# Patient Record
Sex: MALE | Race: WHITE | NOT HISPANIC OR LATINO | Employment: FULL TIME | URBAN - METROPOLITAN AREA
[De-identification: names, ages, dates, MRNs, and addresses within clinical notes are randomized per-mention and may not be internally consistent; named-entity substitution may affect disease eponyms.]

---

## 2017-01-09 ENCOUNTER — OFFICE VISIT - HEALTHEAST (OUTPATIENT)
Dept: FAMILY MEDICINE | Facility: CLINIC | Age: 32
End: 2017-01-09

## 2017-01-09 DIAGNOSIS — F19.10 POLYSUBSTANCE ABUSE (H): ICD-10-CM

## 2017-01-09 DIAGNOSIS — F31.62 BIPOLAR 1 DISORDER, MIXED, MODERATE (H): ICD-10-CM

## 2017-01-09 ASSESSMENT — MIFFLIN-ST. JEOR: SCORE: 1819.62

## 2017-03-23 ENCOUNTER — COMMUNICATION - HEALTHEAST (OUTPATIENT)
Dept: FAMILY MEDICINE | Facility: CLINIC | Age: 32
End: 2017-03-23

## 2017-03-23 DIAGNOSIS — K60.2 ANAL FISSURE: ICD-10-CM

## 2017-04-23 ENCOUNTER — RECORDS - HEALTHEAST (OUTPATIENT)
Dept: ADMINISTRATIVE | Facility: OTHER | Age: 32
End: 2017-04-23

## 2017-05-30 ENCOUNTER — COMMUNICATION - HEALTHEAST (OUTPATIENT)
Dept: FAMILY MEDICINE | Facility: CLINIC | Age: 32
End: 2017-05-30

## 2017-05-30 DIAGNOSIS — F31.62 BIPOLAR 1 DISORDER, MIXED, MODERATE (H): ICD-10-CM

## 2017-05-30 DIAGNOSIS — K60.2 ANAL FISSURE: ICD-10-CM

## 2017-06-22 ENCOUNTER — COMMUNICATION - HEALTHEAST (OUTPATIENT)
Dept: FAMILY MEDICINE | Facility: CLINIC | Age: 32
End: 2017-06-22

## 2017-06-22 DIAGNOSIS — F31.62 BIPOLAR 1 DISORDER, MIXED, MODERATE (H): ICD-10-CM

## 2017-06-27 ENCOUNTER — OFFICE VISIT - HEALTHEAST (OUTPATIENT)
Dept: FAMILY MEDICINE | Facility: CLINIC | Age: 32
End: 2017-06-27

## 2017-06-27 DIAGNOSIS — F17.200 TOBACCO USE DISORDER: ICD-10-CM

## 2017-06-27 DIAGNOSIS — K04.7 TOOTH INFECTION: ICD-10-CM

## 2017-06-27 DIAGNOSIS — F12.20 CANNABIS DEPENDENCE (H): ICD-10-CM

## 2017-06-27 DIAGNOSIS — F41.1 ANXIETY STATE: ICD-10-CM

## 2017-06-27 DIAGNOSIS — F31.62 BIPOLAR 1 DISORDER, MIXED, MODERATE (H): ICD-10-CM

## 2017-08-17 ENCOUNTER — COMMUNICATION - HEALTHEAST (OUTPATIENT)
Dept: FAMILY MEDICINE | Facility: CLINIC | Age: 32
End: 2017-08-17

## 2017-08-20 ENCOUNTER — AMBULATORY - HEALTHEAST (OUTPATIENT)
Dept: FAMILY MEDICINE | Facility: CLINIC | Age: 32
End: 2017-08-20

## 2017-10-10 ENCOUNTER — COMMUNICATION - HEALTHEAST (OUTPATIENT)
Dept: FAMILY MEDICINE | Facility: CLINIC | Age: 32
End: 2017-10-10

## 2017-12-22 ENCOUNTER — COMMUNICATION - HEALTHEAST (OUTPATIENT)
Dept: FAMILY MEDICINE | Facility: CLINIC | Age: 32
End: 2017-12-22

## 2017-12-22 DIAGNOSIS — F31.62 BIPOLAR 1 DISORDER, MIXED, MODERATE (H): ICD-10-CM

## 2017-12-26 ENCOUNTER — COMMUNICATION - HEALTHEAST (OUTPATIENT)
Dept: FAMILY MEDICINE | Facility: CLINIC | Age: 32
End: 2017-12-26

## 2017-12-26 DIAGNOSIS — K60.2 ANAL FISSURE: ICD-10-CM

## 2018-01-09 ENCOUNTER — COMMUNICATION - HEALTHEAST (OUTPATIENT)
Dept: FAMILY MEDICINE | Facility: CLINIC | Age: 33
End: 2018-01-09

## 2018-01-20 ENCOUNTER — COMMUNICATION - HEALTHEAST (OUTPATIENT)
Dept: FAMILY MEDICINE | Facility: CLINIC | Age: 33
End: 2018-01-20

## 2018-01-20 DIAGNOSIS — F31.62 BIPOLAR 1 DISORDER, MIXED, MODERATE (H): ICD-10-CM

## 2018-01-31 ENCOUNTER — COMMUNICATION - HEALTHEAST (OUTPATIENT)
Dept: FAMILY MEDICINE | Facility: CLINIC | Age: 33
End: 2018-01-31

## 2018-01-31 DIAGNOSIS — F41.1 ANXIETY STATE: ICD-10-CM

## 2018-02-05 ENCOUNTER — COMMUNICATION - HEALTHEAST (OUTPATIENT)
Dept: FAMILY MEDICINE | Facility: CLINIC | Age: 33
End: 2018-02-05

## 2018-04-30 ENCOUNTER — COMMUNICATION - HEALTHEAST (OUTPATIENT)
Dept: FAMILY MEDICINE | Facility: CLINIC | Age: 33
End: 2018-04-30

## 2018-04-30 DIAGNOSIS — F31.62 BIPOLAR 1 DISORDER, MIXED, MODERATE (H): ICD-10-CM

## 2018-06-26 ENCOUNTER — COMMUNICATION - HEALTHEAST (OUTPATIENT)
Dept: FAMILY MEDICINE | Facility: CLINIC | Age: 33
End: 2018-06-26

## 2018-06-26 DIAGNOSIS — F31.62 BIPOLAR 1 DISORDER, MIXED, MODERATE (H): ICD-10-CM

## 2018-12-31 ENCOUNTER — COMMUNICATION - HEALTHEAST (OUTPATIENT)
Dept: FAMILY MEDICINE | Facility: CLINIC | Age: 33
End: 2018-12-31

## 2018-12-31 DIAGNOSIS — F31.62 BIPOLAR 1 DISORDER, MIXED, MODERATE (H): ICD-10-CM

## 2019-05-29 ENCOUNTER — COMMUNICATION - HEALTHEAST (OUTPATIENT)
Dept: FAMILY MEDICINE | Facility: CLINIC | Age: 34
End: 2019-05-29

## 2019-05-29 DIAGNOSIS — K60.2 ANAL FISSURE: ICD-10-CM

## 2019-05-31 ENCOUNTER — COMMUNICATION - HEALTHEAST (OUTPATIENT)
Dept: FAMILY MEDICINE | Facility: CLINIC | Age: 34
End: 2019-05-31

## 2019-10-20 ENCOUNTER — COMMUNICATION - HEALTHEAST (OUTPATIENT)
Dept: FAMILY MEDICINE | Facility: CLINIC | Age: 34
End: 2019-10-20

## 2019-10-20 DIAGNOSIS — F41.1 ANXIETY STATE: ICD-10-CM

## 2019-10-20 DIAGNOSIS — K60.2 ANAL FISSURE: ICD-10-CM

## 2019-10-25 ENCOUNTER — COMMUNICATION - HEALTHEAST (OUTPATIENT)
Dept: FAMILY MEDICINE | Facility: CLINIC | Age: 34
End: 2019-10-25

## 2019-10-25 DIAGNOSIS — F31.62 BIPOLAR 1 DISORDER, MIXED, MODERATE (H): ICD-10-CM

## 2020-02-24 ENCOUNTER — COMMUNICATION - HEALTHEAST (OUTPATIENT)
Dept: FAMILY MEDICINE | Facility: CLINIC | Age: 35
End: 2020-02-24

## 2020-02-24 DIAGNOSIS — K60.2 ANAL FISSURE: ICD-10-CM

## 2020-02-26 ENCOUNTER — COMMUNICATION - HEALTHEAST (OUTPATIENT)
Dept: FAMILY MEDICINE | Facility: CLINIC | Age: 35
End: 2020-02-26

## 2020-04-27 ENCOUNTER — COMMUNICATION - HEALTHEAST (OUTPATIENT)
Dept: FAMILY MEDICINE | Facility: CLINIC | Age: 35
End: 2020-04-27

## 2020-04-27 DIAGNOSIS — K60.2 ANAL FISSURE: ICD-10-CM

## 2020-04-30 ENCOUNTER — COMMUNICATION - HEALTHEAST (OUTPATIENT)
Dept: FAMILY MEDICINE | Facility: CLINIC | Age: 35
End: 2020-04-30

## 2020-04-30 DIAGNOSIS — K60.2 ANAL FISSURE: ICD-10-CM

## 2020-05-18 ENCOUNTER — OFFICE VISIT (OUTPATIENT)
Dept: URGENT CARE | Facility: URGENT CARE | Age: 35
End: 2020-05-18
Payer: COMMERCIAL

## 2020-05-18 VITALS
HEART RATE: 104 BPM | HEIGHT: 72 IN | BODY MASS INDEX: 27.36 KG/M2 | SYSTOLIC BLOOD PRESSURE: 122 MMHG | DIASTOLIC BLOOD PRESSURE: 68 MMHG | TEMPERATURE: 98.3 F | WEIGHT: 202 LBS | OXYGEN SATURATION: 98 %

## 2020-05-18 DIAGNOSIS — K12.1 ORAL ULCERATION DUE TO THERMAL BURN: Primary | ICD-10-CM

## 2020-05-18 DIAGNOSIS — T28.0XXA ORAL ULCERATION DUE TO THERMAL BURN: Primary | ICD-10-CM

## 2020-05-18 PROCEDURE — 99203 OFFICE O/P NEW LOW 30 MIN: CPT | Performed by: PHYSICIAN ASSISTANT

## 2020-05-18 RX ORDER — HYDROCODONE BITARTRATE AND ACETAMINOPHEN 5; 325 MG/1; MG/1
1 TABLET ORAL EVERY 6 HOURS PRN
Qty: 10 TABLET | Refills: 0 | Status: SHIPPED | OUTPATIENT
Start: 2020-05-18 | End: 2020-05-22

## 2020-05-18 RX ORDER — IBUPROFEN 600 MG/1
600 TABLET, FILM COATED ORAL EVERY 6 HOURS PRN
Qty: 40 TABLET | Refills: 0 | Status: SHIPPED | OUTPATIENT
Start: 2020-05-18 | End: 2020-05-28

## 2020-05-18 RX ORDER — LIDOCAINE HYDROCHLORIDE 20 MG/ML
15 SOLUTION OROPHARYNGEAL
Qty: 100 ML | Refills: 0 | Status: SHIPPED | OUTPATIENT
Start: 2020-05-18

## 2020-05-18 RX ORDER — ACETAMINOPHEN 500 MG
500-1000 TABLET ORAL EVERY 6 HOURS PRN
Qty: 40 TABLET | Refills: 0 | Status: SHIPPED | OUTPATIENT
Start: 2020-05-18 | End: 2020-05-28

## 2020-05-18 ASSESSMENT — ENCOUNTER SYMPTOMS
GASTROINTESTINAL NEGATIVE: 1
RESPIRATORY NEGATIVE: 1
MUSCULOSKELETAL NEGATIVE: 1
CONSTITUTIONAL NEGATIVE: 1
PSYCHIATRIC NEGATIVE: 1
CARDIOVASCULAR NEGATIVE: 1

## 2020-05-18 ASSESSMENT — MIFFLIN-ST. JEOR: SCORE: 1884.27

## 2020-05-18 NOTE — LETTER
May 18, 2020      To Whom It May Concern:      Bruce Nolan was seen in our urgent care today, 05/18/20.  I expect his condition to improve over the next 1-3 days. Please excuse his absence today. He may return to work when improved.    Sincerely,        Teja Figueroa PA-C
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
## 2020-05-22 DIAGNOSIS — T28.0XXA ORAL ULCERATION DUE TO THERMAL BURN: ICD-10-CM

## 2020-05-22 DIAGNOSIS — K12.1 ORAL ULCERATION DUE TO THERMAL BURN: ICD-10-CM

## 2020-05-22 RX ORDER — HYDROCODONE BITARTRATE AND ACETAMINOPHEN 5; 325 MG/1; MG/1
1 TABLET ORAL EVERY 6 HOURS PRN
Qty: 5 TABLET | Refills: 0 | Status: SHIPPED | OUTPATIENT
Start: 2020-05-22

## 2020-05-22 NOTE — TELEPHONE ENCOUNTER
PT SAID THAT SAID THAT NORCO DOESN'T HELP AND HE STILL IN ALOT OF PAIN. SOMETHING ABOUT HIS TONGUE BURN. HE WANT COUPLE MORE DAY AND INCREASE STRENTH IF POSSIBLE. HE WILLING TO COME BACK TO SEE MD IF NEEDED. Please reach out to pt if needed        Thank You,  Greg Neal, [facility]  On behalf of [facility]

## 2020-05-28 ENCOUNTER — NURSE TRIAGE (OUTPATIENT)
Dept: NURSING | Facility: CLINIC | Age: 35
End: 2020-05-28

## 2020-05-28 ENCOUNTER — OFFICE VISIT (OUTPATIENT)
Dept: URGENT CARE | Facility: URGENT CARE | Age: 35
End: 2020-05-28
Payer: COMMERCIAL

## 2020-05-28 VITALS
OXYGEN SATURATION: 97 % | DIASTOLIC BLOOD PRESSURE: 62 MMHG | HEIGHT: 72 IN | HEART RATE: 73 BPM | TEMPERATURE: 98 F | SYSTOLIC BLOOD PRESSURE: 117 MMHG | BODY MASS INDEX: 27.36 KG/M2 | WEIGHT: 202 LBS

## 2020-05-28 DIAGNOSIS — T28.0XXD: Primary | ICD-10-CM

## 2020-05-28 PROCEDURE — 99214 OFFICE O/P EST MOD 30 MIN: CPT | Performed by: NURSE PRACTITIONER

## 2020-05-28 RX ORDER — LIDOCAINE 50 MG/G
OINTMENT TOPICAL ONCE
Status: CANCELLED | OUTPATIENT
Start: 2020-05-28 | End: 2020-05-28

## 2020-05-28 ASSESSMENT — MIFFLIN-ST. JEOR: SCORE: 1884.27

## 2020-05-28 NOTE — TELEPHONE ENCOUNTER
Pharmacist Jaxson at [facility] reports they do not have lidocaine 5% cream (See  note from today). Jaxson reports they do have viscous lidocaine 2% or lidocaine/prilocaine 2.65% cream. Needs to be sent asap as pharmacy is closing at 1630 today.     Writer contacted Danita at [facility] who connected Writer to DOMINICK Starks. Ivone requests pharmacist call her at [phone]. Writer spoke with pharmacist Garrett and gave him the information.    Garrett will contact DOMINICK Wiseman.    Reason for Disposition    Pharmacy calling with prescription questions and triager unable to answer question    Protocols used: MEDICATION QUESTION CALL-A-
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
## 2020-05-28 NOTE — PATIENT INSTRUCTIONS
Use just a dab of the Lidocaine cream/ointment on the tongue . Place with a cotton tip applicator. Spit out any excess.    Acetaminophen (Tylenol) may be taken up to 4000mg daily (3000mg if over 65) which would be 2 regular strength tablets (325mg) or two extra strength tablets(500mg) up to 4 times a day (3 times a day if over 65).   Check for acetaminophen in other OTC or prescription medications and be sure you add this in the maximum amount you take every day.    Too much acetaminophen can lead to serious liver damage. DO NOT TAKE if you have a history of liver disease.    Ibuprofen 200mg tablets may be taken up to 4 pills 4 times a day(three times a day if over 65)  to the maximum of 3200mg,  (2400mg if >65)  daily as needed for pain.   Take with food.  Don't take with aspirin, Aleve or other antiinflammatory medication or with warfarin. DO NOT TAKE if you have a history of kidney disease.

## 2020-06-01 ENCOUNTER — NURSE TRIAGE (OUTPATIENT)
Dept: NURSING | Facility: CLINIC | Age: 35
End: 2020-06-01
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
## 2021-05-29 NOTE — TELEPHONE ENCOUNTER
RN cannot approve Refill Request    RN can NOT refill this medication med is not covered by policy/route to provider. Last office visit: 6/27/2017 Jasen Jacques MD Last Physical: Visit date not found Last MTM visit: Visit date not found Last visit same specialty: 6/27/2017 Jasen Jacques MD.  Next visit within 3 mo: Visit date not found  Next physical within 3 mo: Visit date not found      Araseli Montague, [facility] Connection Triage/Med Refill 5/30/2019    Requested Prescriptions   Pending Prescriptions Disp Refills     lidocaine (XYLOCAINE) 5 % ointment [Pharmacy Med Name: LIDOCAINE 5%  OIN]  6     Sig: APPLY  OINTMENT EXTERNALLY THREE TIMES DAILY AS NEEDED       There is no refill protocol information for this order

## 2021-05-30 VITALS — BODY MASS INDEX: 25.85 KG/M2 | HEIGHT: 72 IN

## 2021-05-30 VITALS — HEIGHT: 72 IN | BODY MASS INDEX: 25.81 KG/M2 | WEIGHT: 190.6 LBS

## 2021-05-30 VITALS — WEIGHT: 190 LBS | BODY MASS INDEX: 25.77 KG/M2

## 2021-05-31 VITALS — BODY MASS INDEX: 27.29 KG/M2 | WEIGHT: 201.2 LBS

## 2021-06-02 NOTE — TELEPHONE ENCOUNTER
RN cannot approve Refill Request    RN can NOT refill this medication med is not covered by policy/route to provider and Protocol failed and NO refill given. Last office visit: 6/27/2017 Jasen Jacques MD Last Physical: Visit date not found Last MTM visit: Visit date not found Last visit same specialty: 6/27/2017 Jasen Jacques MD.  Next visit within 3 mo: Visit date not found  Next physical within 3 mo: Visit date not found      Liliana Guaman [facility] Connection Triage/Med Refill 10/20/2019    Requested Prescriptions   Pending Prescriptions Disp Refills     doxepin (SINEQUAN) 50 MG capsule [Pharmacy Med Name: DOXEPIN HCL 50MG    CAP] 30 capsule 5     Sig: TAKE ONE CAPSULE BY MOUTH AT BEDTIME AS NEEDED FOR SLEEP       Tricyclics/Misc Antidepressant/Antianxiety Meds Refill Protocol Failed - 10/20/2019  1:18 PM        Failed - PCP or prescribing provider visit in last year     Last office visit with prescriber/PCP: 6/27/2017 Jasen Jacques MD OR same dept: Visit date not found OR same specialty: 6/27/2017 Jasen Jacques MD  Last physical: Visit date not found Last MTM visit: Visit date not found   Next visit within 3 mo: Visit date not found  Next physical within 3 mo: Visit date not found  Prescriber OR PCP: Jasen Jacques MD  Last diagnosis associated with med order: 1. Anal fissure  - lidocaine (XYLOCAINE) 5 % ointment [Pharmacy Med Name: LIDOCAINE 5%  OIN]; APPLY  OINTMENT EXTERNALLY THREE TIMES DAILY AS NEEDED; Refill: 6    2. Anxiety  - hydrOXYzine pamoate (VISTARIL) 50 MG capsule [Pharmacy Med Name: HYDROXYZINE PAMOATE 50MG CAP]; TAKE ONE CAPSULE BY MOUTH EVERY 6 TO 8 HOURS FOR ANXIETY  Dispense: 120 capsule; Refill: 5    If protocol passes may refill for 12 months if within 3 months of last provider visit (or a total of 15 months).             lidocaine (XYLOCAINE) 5 % ointment [Pharmacy Med Name: LIDOCAINE 5%  OIN]  6     Sig: APPLY  OINTMENT EXTERNALLY THREE TIMES DAILY AS NEEDED       There is no  refill protocol information for this order        hydrOXYzine pamoate (VISTARIL) 50 MG capsule [Pharmacy Med Name: HYDROXYZINE PAMOATE 50MG CAP] 120 capsule 5     Sig: TAKE ONE CAPSULE BY MOUTH EVERY 6 TO 8 HOURS FOR ANXIETY       Antihistamine Refill Protocol Failed - 10/20/2019  1:18 PM        Failed - Patient has had office visit/physical in last year     Last office visit with prescriber/PCP: 6/27/2017 Jasen Jacques MD OR same dept: Visit date not found OR same specialty: 6/27/2017 Jasen Jacques MD  Last physical: Visit date not found Last MTM visit: Visit date not found   Next visit within 3 mo: Visit date not found  Next physical within 3 mo: Visit date not found  Prescriber OR PCP: Jasen Jacques MD  Last diagnosis associated with med order: 1. Anal fissure  - lidocaine (XYLOCAINE) 5 % ointment [Pharmacy Med Name: LIDOCAINE 5%  OIN]; APPLY  OINTMENT EXTERNALLY THREE TIMES DAILY AS NEEDED; Refill: 6    2. Anxiety  - hydrOXYzine pamoate (VISTARIL) 50 MG capsule [Pharmacy Med Name: HYDROXYZINE PAMOATE 50MG CAP]; TAKE ONE CAPSULE BY MOUTH EVERY 6 TO 8 HOURS FOR ANXIETY  Dispense: 120 capsule; Refill: 5    If protocol passes may refill for 12 months if within 3 months of last provider visit (or a total of 15 months).

## 2021-06-02 NOTE — TELEPHONE ENCOUNTER
RN cannot approve Refill Request    RN can NOT refill this medication med is not covered by policy/route to provider. Last office visit: 6/27/2017 Jasen Jacques MD Last Physical: Visit date not found Last MTM visit: Visit date not found Last visit same specialty: 6/27/2017 Jasen Jacques MD.  Next visit within 3 mo: Visit date not found  Next physical within 3 mo: Visit date not found      Flaca Silverman, [facility] Connection Triage/Med Refill 10/25/2019    Requested Prescriptions   Pending Prescriptions Disp Refills     QUEtiapine (SEROQUEL) 50 MG tablet 60 tablet 0     Sig: One and a half each morning (75 mg), afternoon also 75 mg and at night 150 mg.  Six pills daily.       There is no refill protocol information for this order

## 2021-06-06 NOTE — TELEPHONE ENCOUNTER
Refill Request  Did you contact pharmacy: Yes  Medication name:   Requested Prescriptions     Pending Prescriptions Disp Refills     lidocaine (XYLOCAINE) 5 % ointment 120 g 1     Who prescribed the medication: Jasen Jacques MD   Requested Pharmacy: [facility]  Is patient out of medication: Yes  Patient notified refills processed in 3 business days:  yes  Okay to leave a detailed message: yes  The patient schedule an appointment with Jasen Jacques MD for a physical on 2/26/20.  Patient states to let Dr Jacques know he is in  pain and would like the refill now that he has scheduled an appointment.

## 2021-06-07 NOTE — TELEPHONE ENCOUNTER
RN cannot approve Refill Request    RN can NOT refill this medication med is not covered by policy/route to provider. Last office visit: 6/27/2017 Jasen Jacques MD Last Physical: Visit date not found Last MTM visit: Visit date not found Last visit same specialty: 6/27/2017 Jasen Jacques MD.  Next visit within 3 mo: Visit date not found  Next physical within 3 mo: Visit date not found      Shira Madrigal, [facility] Connection Triage/Med Refill 4/30/2020    Requested Prescriptions   Pending Prescriptions Disp Refills     lidocaine (XYLOCAINE) 5 % ointment [Pharmacy Med Name: Lidocaine 5 % External Ointment] 36 g 0     Sig: APPLY  OINTMENT EXTERNALLY THREE TIMES DAILY AS NEEDED       There is no refill protocol information for this order

## 2021-06-07 NOTE — TELEPHONE ENCOUNTER
RN cannot approve Refill Request    RN can NOT refill this medication med is not covered by policy/route to provider     . Last office visit: 6/27/2017 Jasen Jacques MD Last Physical: Visit date not found Last MTM visit: Visit date not found Last visit same specialty: 6/27/2017 Jasen Jacques MD.  Next visit within 3 mo: Visit date not found  Next physical within 3 mo: Visit date not found      Kanchan Maurer, [facility] Connection Triage/Med Refill 4/28/2020    Requested Prescriptions   Pending Prescriptions Disp Refills     lidocaine (XYLOCAINE) 5 % ointment 120 g 1       There is no refill protocol information for this order

## 2021-06-08 NOTE — PROGRESS NOTES
PSYCHIATRY CLINIC VISIT NOTE        DIAGNOSIS:   Principal Problem:    1. Bipolar 1 disorder, mixed, moderate    2. Polysubstance abuse       PLAN:   1. Ongoing education given regarding diagnostic and treatment options with adequate verbalization of understanding.  2. Continue Seroquel but switch back to the immediate release formulation.  25 mg twice a day and 100 mg daily at bedtime.  Add Lamictal titrated to 100 mg daily for mood stabilization/depression.  Hydroxyzine pamoate 25-50 milligrams 3 times a day when necessary for anxiety.  Declined patient's request for lorazepam.  Informed him that long-term use of this is not recommended.  3. Crisis planning in place.  4. Continue/refer to Primary Care Provider.  5. Continue efforts at sobriety.  6. Psychotherapy provided in-session.  Encouraged that he restart individual therapy with Patience Backer  7. Medication side effects/warning signs reviewed.  8. Return in about 6 weeks (around 2/20/2017) for Recheck.    Risk Assessment: Patient able to contract for safety           DATE OF SERVICE:   1/9/2017         REASON FOR VISIT:     Chief Complaint   Patient presents with     Mental Health Problem     pt states seroquel is doing well, but he still has panic attacks and physical shut downs.             HPI:    This is a 31 y.o. male with history of Bipolar disorder and polysubstance abuse.  No hospitalizations related to this. His drug of choice is methamphetamines and marijuana    Last visit 6/21/16.  Recommendation at last visit was to stop the doxepin and switched to Seroquel 25 mg every morning and  milligrams daily at bedtime.  Was to follow-up with this provider in August but did not keep his follow-up appointment.    Coordination of cares performed to include detailed review of charts indicated patient did follow up with his PCP as well as Dr. Gonzalez requests lorazepam prescriptions.  These requests were denied.  His Seroquel was changed to XR version  after he did complain of sedation with this.  States he took the Seroquel  mg daily at bedtime for a short period time but found that this made him even more sedated so he is back to taking the immediate release Seroquel 25 mg in the morning, 25 mg in afternoon and  100 mg at bedtime    Today, patient reports that the Seroquel has helped with his mood instability and anxiety but he still has prevalent anxiety several times a week.  Again he does ask for a small prescription for lorazepam to use to 3 times a week when he is feeling very anxious.  Also does complain of depression.  Denies any active suicidal ideations.  Sleeping 6-7 hours per night.    He also states towards the end of the visits that he has been hearing voices for many years.  States he hears voices but there are no command voices.  These occur daily.  States they have improved since adding the Seroquel.  Also does have vague report of intermittent visual hallucinations.  Finds that the anxiety does trigger these symptoms.    Denies any current marijuana use.  States he has not used any methamphetamine for many months.  Was seen Patience blas at the [facility] for individual therapy but has not seen her since August 2016.      SHx:   Born and Raised: Born and raised in [place]. He does not know his biological father. Was raised by his mother until age of 7. Was removed from the home by child protective services due to mom's drug use. He was then raised by his maternal grandparents. Denies any trauma or abuse once he was living with his grandparents. He did graduate from high school and attended 1 year college.  Occupation: Currently working part-time through a temp agency  Marital Status: Single, never   Children: none  Living Situation: Living arrangements - the patient lives with their family.  Continues live with his maternal grandparents         MEDICATIONS:     Current Outpatient Prescriptions   Medication Sig Dispense  "Refill     QUEtiapine (SEROQUEL XR) 200 MG 24 hr tablet Take 1 tablet (200 mg total) by mouth bedtime. 30 tablet 0     No current facility-administered medications for this visit.        Medication adherence: Reviewed risk/benefits of medication , Patient able to verbalize understanding of side effects  and Patient verbally consents to taking medications  Medication side effects: sedation  Benefit: yes     website reviewed re:controlled substance use       ROS:   All systems negative except as mentioned above in HPI.          MENTAL STATUS EXAM:   Vitals:   Visit Vitals     /76 (Patient Site: Right Arm, Patient Position: Sitting, Cuff Size: Adult Regular)     Pulse 100     Ht 5' 11.5\" (1.816 m)     Wt 190 lb 9.6 oz (86.5 kg)     SpO2 98%     BMI 26.21 kg/m2       Appearance:  Clean/neat  Mood:  anxious and depressed  Affect: anxious  Suicidal Ideation: absent  Homicidal Ideation: absent  Thought process: normal  Thought content: Normal  Fund of Knowledge: Sufficient  Attention/Concentration: intact  Language ability: intact  Memory: recent and remote memory intact  Insight and Judgement: fair  Orientation: person, place, time and situation  Psychomotor Behavior: normal  Muscle Strength and Tone: normal  Gait and Station: normal gait and station         PHYSICAL EXAM:   General appearance - alert, well appearing, and in no distress  Mental status - alert, oriented to person, place, and time  Neurological - alert, oriented, normal speech, no focal findings or movement disorder noted         LABS:   Personally reviewed.     Total time  25 minutes with > 50% spent on coordination of cares and psycho-education.      Eugenie Tamez, JESSICA  "

## 2021-06-11 NOTE — PROGRESS NOTES
ASSESSMENT:  1. Bipolar 1 disorder, mixed, moderate  Well-controlled on Seroquel  - QUEtiapine (SEROQUEL) 50 MG tablet; One and a half each morning (75 mg), afternoon also 75 mg and at night 150 mg.  Six pills daily.  Dispense: 180 tablet; Refill: 5    2. Anxiety  Both Seroquel and hydroxyzine are helping.    3. Nicotine Dependence  In remission ×2 months using E cigarettes.    4. Cannabis dependence  Patient with a history of dependence and prior treatment, he uses still on occasion.    5. Tooth infection  Diagnosed recently on clindamycin which did seem to help does have a follow-up appointment with a dentist but not until after July 1.      PLAN:  1.  Renewed both the hydroxyzine and Seroquel and clarify dosing.  2.  I asked the patient to consider his use of marijuana and whether or not he felt that was in any way an issue or problem.  3.  I told the patient however given his history I would not put him back on lorazepam.  4.  I did renew the clindamycin 300 mg 3 times daily ×10 days, patient to see a dentist in the near future.  5.  Six-month follow-up.    No orders of the defined types were placed in this encounter.    Medications Discontinued During This Encounter   Medication Reason     QUEtiapine (SEROQUEL) 50 MG tablet Reorder       No Follow-up on file.    CHIEF COMPLAINT:  Chief Complaint   Patient presents with     tooth infection     was given an antibiotic for a tooth infection. He has taken it and since finishing it the infection has come back. He currently doesn't have dental coverage, wondering if he can be given a refill of the antibiotic?     Depression     follow up/ needs refills        SUBJECTIVE:  Bruce is a 31 y.o. male who presents to the clinic today to follow-up regarding anxiety.    Anxiety: He is currently taking Vistaril in order to control his mood. He states that Vistaril has been helpful. He takes 2 capsules three times per day. He has been on lorazepam in the past.    Bipolar  Disorder: He is currently using Seroquel in order to help control his mood. He is using the Seroquel at night, which has been helpful for sleeping at night. He is using 50 to 75 mg twice per day in the morning and afternoon and 150 mg at night prior to going to bed.    Tooth Pain: He has had a tooth infection about two weeks ago with gum swelling. He was seen at [facility], who drained an abscess in the gums. He was given clindamycin for 10 days, which was helpful, but the swelling returned after he stopped taking the antibiotic. He stopped taking clindamycin five days ago. He cannot see his dentist until July due to insurance difficulties, but he is continuing to have pain in the area.    Nicotine Addiction: He is no longer smoking cigarettes and is using an electronic cigarette. He has not used cigarettes for about 2 months. He has had less nicotine cravings since he started using the electronic cigarette.    Cannabis Dependence: He has been using marijuana since age 16 off and on weekly. He uses marijuana at night in order to help him sleep.    REVIEW OF SYSTEMS:   He states that his health has been doing well overall.  He has been sober from methamphetamines since 2015. He will wake up in the middle of the night at times and fall back asleep after a glass of milk. All other systems are negative.    PFSH:  He got a new job about 8 months ago.  Immunization History   Administered Date(s) Administered     DT (pediatric) 01/01/2002     Hep A, historic 09/24/2010     Influenza T5x0-30, 12/31/2009     Influenza, Seasonal, Inj PF 11/25/2011     Influenza, inj, historic 12/31/2009     Influenza, seasonal,quad inj 36+ mos 11/09/2015, 10/18/2016     Influenza, seasonal,quad inj 6-35 mos 09/24/2010     MMR 04/10/1996     Pneumo Polysac 23-V 08/25/2012     Td, historic 01/01/2002     Tdap 03/12/2010     Social History     Social History     Marital status: Single     Spouse name: N/A     Number of children: 0     Years of  education: 14     Occupational History     [employer], selling magazine subscriptions      [employer]     Social History Main Topics     Smoking status: Current Every Day Smoker     Types: Cigarettes     Smokeless tobacco: Never Used     Alcohol use No     Drug use: No      Comment: prior  cannabis, Meth     Sexual activity: Yes     Partners: Female     Birth control/ protection: None     Other Topics Concern     Not on file     Social History Narrative    Diet: Regular with metamucil        Was in prison in 2014     Past Medical History:   Diagnosis Date     Anal fissure      Family History   Problem Relation Age of Onset     Bipolar disorder Mother      Alcoholism Mother      Drug abuse Mother      Cocaine     Alcoholism Sister      Drug abuse Sister      Cocaine       MEDICATIONS:  Current Outpatient Prescriptions   Medication Sig Dispense Refill     hydrOXYzine (VISTARIL) 25 MG capsule Take 1-2 capsules (25-50 mg total) by mouth 3 (three) times a day as needed for anxiety. 30 capsule 0     lamoTRIgine (LAMICTAL) 100 MG tablet Take 1 tablet (100 mg total) by mouth daily. 30 tablet 1     lidocaine (XYLOCAINE) 5 % ointment Apply topically 3 (three) times a day as needed. Apply up to 3 times daily as needed. 60 g 0     QUEtiapine (SEROQUEL) 50 MG tablet One and a half each morning (75 mg), afternoon also 75 mg and at night 150 mg.  Six pills daily. 180 tablet 5     clindamycin (CLEOCIN) 300 MG capsule Take 1 capsule (300 mg total) by mouth 3 (three) times a day for 10 days. 30 capsule 0     hydrOXYzine (VISTARIL) 50 MG capsule Take 1 capsule (50 mg total) by mouth 4 (four) times a day as needed for itching. 120 capsule 5     No current facility-administered medications for this visit.        TOBACCO USE:  History   Smoking Status     Current Every Day Smoker     Types: Cigarettes   Smokeless Tobacco     Never Used       VITALS:  Vitals:    06/27/17 0820   BP: 112/58   Patient Site: Right Arm   Patient  Position: Sitting   Cuff Size: Adult Regular   Pulse: 84   Weight: 201 lb 3.2 oz (91.3 kg)     Wt Readings from Last 3 Encounters:   06/27/17 201 lb 3.2 oz (91.3 kg)   05/12/17 190 lb (86.2 kg)   04/24/17 200 lb (90.7 kg)       PHYSICAL EXAM:  Constitutional:   Reveals an alert male that appears stated age.  Vitals: per nursing notes.  Neuro:  Alert and oriented. Cranial nerves, motor, sensory exams are intact.  No gross focal deficits.  Psychiatric:  Memory intact, mood appropriate.    QUALITY MEASURES:  I have counseled the patient for tobacco cessation and the follow up will occur  at the next visit.    DATA REVIEWED:  Additional History from Old Records Summarized (2): None  Decision to Obtain Records (1): Accessed Care Everywhere to review medications from [facility].  Radiology Tests Summarized or Ordered (1): None  Labs Reviewed or Ordered (1): None  Medicine Test Summarized or Ordered (1): None  Independent Review of EKG, X-RAY, or RAPID STREP (2 each): None    The visit lasted a total of 14 minutes face to face with the patient. Over 50% of the time was spent counseling and educating the patient about mental health.    INathen, am scribing for and in the presence of, Dr. Jacques.    I, Dr. Jacques, personally performed the services described in this documentation, as scribed by Nathen Wolfe in my presence, and it is both accurate and complete.    Total Data Points: 1

## 2021-06-19 NOTE — LETTER
Letter by Jasen Jacques MD at      Author: Jasen Jacques MD Service: -- Author Type: --    Filed:  Encounter Date: 5/31/2019 Status: (Other)         Bruce Nolan  [address] [facility] [address]             May 31, 2019         Dear Mr. Nolan,        Please call with questions or contact us using [facility].    Sincerely,        Electronically signed by Jasen Jacques MD

## 2021-06-20 NOTE — LETTER
Letter by Jasen Jacques MD at      Author: Jasen Jacques MD Service: -- Author Type: --    Filed:  Encounter Date: 2/26/2020 Status: (Other)         Bruce Nolan  [address]  [facility] [address]          Dear Bruce Nolan ,    Our records indicate that you missed your appointment with Provider Jasen Jacques MD on 02/26/2020. We understand if this was due to unforeseen circumstances or a simple oversight.      In the future when you schedule your next visit, we do ask that you honor you appointment time with your provider since this is valuable time set aside for your healthcare.       We also ask that you show up 10-15 minutes early to all appointments in order to update any necessary information.        We are very interested in your health care and hope to hear from you soon.  If you have any questions,  Please contact our clinic at [phone].

## 2023-06-23 NOTE — LETTER
May 28, 2020      Bruce Nolan  [address]  [place] [address]        To Whom It May Concern:    Bruce Nolan  was seen on 5/28/2020.  Please excuse him  until 5/29/2020 due to medical issue..        Sincerely,        Prema Navarro  Provider     diagnostic cardiac cath, no intervention